# Patient Record
Sex: FEMALE | Race: OTHER | NOT HISPANIC OR LATINO | URBAN - METROPOLITAN AREA
[De-identification: names, ages, dates, MRNs, and addresses within clinical notes are randomized per-mention and may not be internally consistent; named-entity substitution may affect disease eponyms.]

---

## 2021-08-30 ENCOUNTER — EMERGENCY (EMERGENCY)
Facility: HOSPITAL | Age: 56
LOS: 0 days | Discharge: HOME | End: 2021-08-30
Attending: STUDENT IN AN ORGANIZED HEALTH CARE EDUCATION/TRAINING PROGRAM | Admitting: STUDENT IN AN ORGANIZED HEALTH CARE EDUCATION/TRAINING PROGRAM
Payer: COMMERCIAL

## 2021-08-30 ENCOUNTER — INPATIENT (INPATIENT)
Facility: HOSPITAL | Age: 56
LOS: 1 days | Discharge: HOME | End: 2021-09-01
Attending: INTERNAL MEDICINE | Admitting: INTERNAL MEDICINE
Payer: COMMERCIAL

## 2021-08-30 VITALS
RESPIRATION RATE: 20 BRPM | DIASTOLIC BLOOD PRESSURE: 80 MMHG | HEART RATE: 86 BPM | TEMPERATURE: 98 F | SYSTOLIC BLOOD PRESSURE: 160 MMHG | OXYGEN SATURATION: 97 %

## 2021-08-30 VITALS
DIASTOLIC BLOOD PRESSURE: 78 MMHG | HEART RATE: 83 BPM | OXYGEN SATURATION: 99 % | RESPIRATION RATE: 18 BRPM | TEMPERATURE: 99 F | SYSTOLIC BLOOD PRESSURE: 122 MMHG

## 2021-08-30 VITALS
HEART RATE: 98 BPM | TEMPERATURE: 98 F | RESPIRATION RATE: 8 BRPM | SYSTOLIC BLOOD PRESSURE: 162 MMHG | OXYGEN SATURATION: 99 % | DIASTOLIC BLOOD PRESSURE: 75 MMHG

## 2021-08-30 DIAGNOSIS — R10.9 UNSPECIFIED ABDOMINAL PAIN: ICD-10-CM

## 2021-08-30 DIAGNOSIS — R11.10 VOMITING, UNSPECIFIED: ICD-10-CM

## 2021-08-30 DIAGNOSIS — N20.0 CALCULUS OF KIDNEY: ICD-10-CM

## 2021-08-30 LAB
ALBUMIN SERPL ELPH-MCNC: 4.2 G/DL — SIGNIFICANT CHANGE UP (ref 3.5–5.2)
ALBUMIN SERPL ELPH-MCNC: 4.4 G/DL — SIGNIFICANT CHANGE UP (ref 3.5–5.2)
ALP SERPL-CCNC: 106 U/L — SIGNIFICANT CHANGE UP (ref 30–115)
ALP SERPL-CCNC: 110 U/L — SIGNIFICANT CHANGE UP (ref 30–115)
ALT FLD-CCNC: 16 U/L — SIGNIFICANT CHANGE UP (ref 0–41)
ALT FLD-CCNC: 17 U/L — SIGNIFICANT CHANGE UP (ref 0–41)
ANION GAP SERPL CALC-SCNC: 11 MMOL/L — SIGNIFICANT CHANGE UP (ref 7–14)
ANION GAP SERPL CALC-SCNC: 21 MMOL/L — HIGH (ref 7–14)
APPEARANCE UR: CLEAR — SIGNIFICANT CHANGE UP
AST SERPL-CCNC: 21 U/L — SIGNIFICANT CHANGE UP (ref 0–41)
AST SERPL-CCNC: 22 U/L — SIGNIFICANT CHANGE UP (ref 0–41)
BACTERIA # UR AUTO: NEGATIVE — SIGNIFICANT CHANGE UP
BASOPHILS # BLD AUTO: 0.02 K/UL — SIGNIFICANT CHANGE UP (ref 0–0.2)
BASOPHILS # BLD AUTO: 0.03 K/UL — SIGNIFICANT CHANGE UP (ref 0–0.2)
BASOPHILS NFR BLD AUTO: 0.2 % — SIGNIFICANT CHANGE UP (ref 0–1)
BASOPHILS NFR BLD AUTO: 0.2 % — SIGNIFICANT CHANGE UP (ref 0–1)
BILIRUB SERPL-MCNC: 0.4 MG/DL — SIGNIFICANT CHANGE UP (ref 0.2–1.2)
BILIRUB SERPL-MCNC: 0.4 MG/DL — SIGNIFICANT CHANGE UP (ref 0.2–1.2)
BILIRUB UR-MCNC: NEGATIVE — SIGNIFICANT CHANGE UP
BUN SERPL-MCNC: 14 MG/DL — SIGNIFICANT CHANGE UP (ref 10–20)
BUN SERPL-MCNC: 15 MG/DL — SIGNIFICANT CHANGE UP (ref 10–20)
CALCIUM SERPL-MCNC: 10.2 MG/DL — HIGH (ref 8.5–10.1)
CALCIUM SERPL-MCNC: 9.7 MG/DL — SIGNIFICANT CHANGE UP (ref 8.5–10.1)
CHLORIDE SERPL-SCNC: 102 MMOL/L — SIGNIFICANT CHANGE UP (ref 98–110)
CHLORIDE SERPL-SCNC: 98 MMOL/L — SIGNIFICANT CHANGE UP (ref 98–110)
CO2 SERPL-SCNC: 20 MMOL/L — SIGNIFICANT CHANGE UP (ref 17–32)
CO2 SERPL-SCNC: 25 MMOL/L — SIGNIFICANT CHANGE UP (ref 17–32)
COLOR SPEC: SIGNIFICANT CHANGE UP
CREAT SERPL-MCNC: 1 MG/DL — SIGNIFICANT CHANGE UP (ref 0.7–1.5)
CREAT SERPL-MCNC: 1.2 MG/DL — SIGNIFICANT CHANGE UP (ref 0.7–1.5)
DIFF PNL FLD: ABNORMAL
EOSINOPHIL # BLD AUTO: 0.03 K/UL — SIGNIFICANT CHANGE UP (ref 0–0.7)
EOSINOPHIL # BLD AUTO: 0.23 K/UL — SIGNIFICANT CHANGE UP (ref 0–0.7)
EOSINOPHIL NFR BLD AUTO: 0.3 % — SIGNIFICANT CHANGE UP (ref 0–8)
EOSINOPHIL NFR BLD AUTO: 1.9 % — SIGNIFICANT CHANGE UP (ref 0–8)
EPI CELLS # UR: 1 /HPF — SIGNIFICANT CHANGE UP (ref 0–5)
GLUCOSE SERPL-MCNC: 128 MG/DL — HIGH (ref 70–99)
GLUCOSE SERPL-MCNC: 149 MG/DL — HIGH (ref 70–99)
GLUCOSE UR QL: NEGATIVE — SIGNIFICANT CHANGE UP
HCG SERPL QL: NEGATIVE — SIGNIFICANT CHANGE UP
HCT VFR BLD CALC: 39.3 % — SIGNIFICANT CHANGE UP (ref 37–47)
HCT VFR BLD CALC: 40.5 % — SIGNIFICANT CHANGE UP (ref 37–47)
HGB BLD-MCNC: 13.2 G/DL — SIGNIFICANT CHANGE UP (ref 12–16)
HGB BLD-MCNC: 13.6 G/DL — SIGNIFICANT CHANGE UP (ref 12–16)
HYALINE CASTS # UR AUTO: 1 /LPF — SIGNIFICANT CHANGE UP (ref 0–7)
IMM GRANULOCYTES NFR BLD AUTO: 0.3 % — SIGNIFICANT CHANGE UP (ref 0.1–0.3)
IMM GRANULOCYTES NFR BLD AUTO: 0.4 % — HIGH (ref 0.1–0.3)
KETONES UR-MCNC: SIGNIFICANT CHANGE UP
LEUKOCYTE ESTERASE UR-ACNC: ABNORMAL
LIDOCAIN IGE QN: 47 U/L — SIGNIFICANT CHANGE UP (ref 7–60)
LIDOCAIN IGE QN: 56 U/L — SIGNIFICANT CHANGE UP (ref 7–60)
LYMPHOCYTES # BLD AUTO: 1.25 K/UL — SIGNIFICANT CHANGE UP (ref 1.2–3.4)
LYMPHOCYTES # BLD AUTO: 13.6 % — LOW (ref 20.5–51.1)
LYMPHOCYTES # BLD AUTO: 4.98 K/UL — HIGH (ref 1.2–3.4)
LYMPHOCYTES # BLD AUTO: 40.7 % — SIGNIFICANT CHANGE UP (ref 20.5–51.1)
MCHC RBC-ENTMCNC: 27.8 PG — SIGNIFICANT CHANGE UP (ref 27–31)
MCHC RBC-ENTMCNC: 27.9 PG — SIGNIFICANT CHANGE UP (ref 27–31)
MCHC RBC-ENTMCNC: 33.6 G/DL — SIGNIFICANT CHANGE UP (ref 32–37)
MCHC RBC-ENTMCNC: 33.6 G/DL — SIGNIFICANT CHANGE UP (ref 32–37)
MCV RBC AUTO: 82.7 FL — SIGNIFICANT CHANGE UP (ref 81–99)
MCV RBC AUTO: 83.1 FL — SIGNIFICANT CHANGE UP (ref 81–99)
MONOCYTES # BLD AUTO: 0.6 K/UL — SIGNIFICANT CHANGE UP (ref 0.1–0.6)
MONOCYTES # BLD AUTO: 0.91 K/UL — HIGH (ref 0.1–0.6)
MONOCYTES NFR BLD AUTO: 6.5 % — SIGNIFICANT CHANGE UP (ref 1.7–9.3)
MONOCYTES NFR BLD AUTO: 7.4 % — SIGNIFICANT CHANGE UP (ref 1.7–9.3)
NEUTROPHILS # BLD AUTO: 6.03 K/UL — SIGNIFICANT CHANGE UP (ref 1.4–6.5)
NEUTROPHILS # BLD AUTO: 7.28 K/UL — HIGH (ref 1.4–6.5)
NEUTROPHILS NFR BLD AUTO: 49.4 % — SIGNIFICANT CHANGE UP (ref 42.2–75.2)
NEUTROPHILS NFR BLD AUTO: 79.1 % — HIGH (ref 42.2–75.2)
NITRITE UR-MCNC: NEGATIVE — SIGNIFICANT CHANGE UP
NRBC # BLD: 0 /100 WBCS — SIGNIFICANT CHANGE UP (ref 0–0)
NRBC # BLD: 0 /100 WBCS — SIGNIFICANT CHANGE UP (ref 0–0)
PH UR: 7.5 — SIGNIFICANT CHANGE UP (ref 5–8)
PLATELET # BLD AUTO: 191 K/UL — SIGNIFICANT CHANGE UP (ref 130–400)
PLATELET # BLD AUTO: 231 K/UL — SIGNIFICANT CHANGE UP (ref 130–400)
POTASSIUM SERPL-MCNC: 3.4 MMOL/L — LOW (ref 3.5–5)
POTASSIUM SERPL-MCNC: 3.7 MMOL/L — SIGNIFICANT CHANGE UP (ref 3.5–5)
POTASSIUM SERPL-SCNC: 3.4 MMOL/L — LOW (ref 3.5–5)
POTASSIUM SERPL-SCNC: 3.7 MMOL/L — SIGNIFICANT CHANGE UP (ref 3.5–5)
PROT SERPL-MCNC: 7.1 G/DL — SIGNIFICANT CHANGE UP (ref 6–8)
PROT SERPL-MCNC: 7.3 G/DL — SIGNIFICANT CHANGE UP (ref 6–8)
PROT UR-MCNC: NEGATIVE — SIGNIFICANT CHANGE UP
RBC # BLD: 4.73 M/UL — SIGNIFICANT CHANGE UP (ref 4.2–5.4)
RBC # BLD: 4.9 M/UL — SIGNIFICANT CHANGE UP (ref 4.2–5.4)
RBC # FLD: 13.1 % — SIGNIFICANT CHANGE UP (ref 11.5–14.5)
RBC # FLD: 13.2 % — SIGNIFICANT CHANGE UP (ref 11.5–14.5)
RBC CASTS # UR COMP ASSIST: 22 /HPF — HIGH (ref 0–4)
SODIUM SERPL-SCNC: 138 MMOL/L — SIGNIFICANT CHANGE UP (ref 135–146)
SODIUM SERPL-SCNC: 139 MMOL/L — SIGNIFICANT CHANGE UP (ref 135–146)
SP GR SPEC: 1.01 — SIGNIFICANT CHANGE UP (ref 1.01–1.03)
TROPONIN T SERPL-MCNC: <0.01 NG/ML — SIGNIFICANT CHANGE UP
UROBILINOGEN FLD QL: SIGNIFICANT CHANGE UP
WBC # BLD: 12.23 K/UL — HIGH (ref 4.8–10.8)
WBC # BLD: 9.21 K/UL — SIGNIFICANT CHANGE UP (ref 4.8–10.8)
WBC # FLD AUTO: 12.23 K/UL — HIGH (ref 4.8–10.8)
WBC # FLD AUTO: 9.21 K/UL — SIGNIFICANT CHANGE UP (ref 4.8–10.8)
WBC UR QL: 3 /HPF — SIGNIFICANT CHANGE UP (ref 0–5)

## 2021-08-30 PROCEDURE — 71275 CT ANGIOGRAPHY CHEST: CPT | Mod: 26,MA

## 2021-08-30 PROCEDURE — 74174 CTA ABD&PLVS W/CONTRAST: CPT | Mod: 26,MA

## 2021-08-30 PROCEDURE — 71045 X-RAY EXAM CHEST 1 VIEW: CPT | Mod: 26

## 2021-08-30 PROCEDURE — 93010 ELECTROCARDIOGRAM REPORT: CPT

## 2021-08-30 PROCEDURE — 99285 EMERGENCY DEPT VISIT HI MDM: CPT

## 2021-08-30 RX ORDER — KETOROLAC TROMETHAMINE 30 MG/ML
15 SYRINGE (ML) INJECTION ONCE
Refills: 0 | Status: DISCONTINUED | OUTPATIENT
Start: 2021-08-30 | End: 2021-08-30

## 2021-08-30 RX ORDER — HYDROMORPHONE HYDROCHLORIDE 2 MG/ML
1 INJECTION INTRAMUSCULAR; INTRAVENOUS; SUBCUTANEOUS ONCE
Refills: 0 | Status: DISCONTINUED | OUTPATIENT
Start: 2021-08-30 | End: 2021-08-30

## 2021-08-30 RX ORDER — CEFPODOXIME PROXETIL 100 MG
1 TABLET ORAL
Qty: 14 | Refills: 0
Start: 2021-08-30 | End: 2021-09-05

## 2021-08-30 RX ORDER — KETOROLAC TROMETHAMINE 30 MG/ML
30 SYRINGE (ML) INJECTION ONCE
Refills: 0 | Status: DISCONTINUED | OUTPATIENT
Start: 2021-08-30 | End: 2021-08-30

## 2021-08-30 RX ORDER — ONDANSETRON 8 MG/1
4 TABLET, FILM COATED ORAL ONCE
Refills: 0 | Status: COMPLETED | OUTPATIENT
Start: 2021-08-30 | End: 2021-08-30

## 2021-08-30 RX ORDER — MORPHINE SULFATE 50 MG/1
4 CAPSULE, EXTENDED RELEASE ORAL ONCE
Refills: 0 | Status: DISCONTINUED | OUTPATIENT
Start: 2021-08-30 | End: 2021-08-30

## 2021-08-30 RX ORDER — SODIUM CHLORIDE 9 MG/ML
1000 INJECTION, SOLUTION INTRAVENOUS ONCE
Refills: 0 | Status: COMPLETED | OUTPATIENT
Start: 2021-08-30 | End: 2021-08-30

## 2021-08-30 RX ORDER — TAMSULOSIN HYDROCHLORIDE 0.4 MG/1
0.4 CAPSULE ORAL ONCE
Refills: 0 | Status: COMPLETED | OUTPATIENT
Start: 2021-08-30 | End: 2021-08-30

## 2021-08-30 RX ORDER — TAMSULOSIN HYDROCHLORIDE 0.4 MG/1
1 CAPSULE ORAL
Qty: 7 | Refills: 0
Start: 2021-08-30 | End: 2021-09-05

## 2021-08-30 RX ORDER — KETOROLAC TROMETHAMINE 30 MG/ML
1 SYRINGE (ML) INJECTION
Qty: 10 | Refills: 0
Start: 2021-08-30 | End: 2021-09-03

## 2021-08-30 RX ORDER — OXYCODONE AND ACETAMINOPHEN 5; 325 MG/1; MG/1
1 TABLET ORAL ONCE
Refills: 0 | Status: DISCONTINUED | OUTPATIENT
Start: 2021-08-30 | End: 2021-08-30

## 2021-08-30 RX ADMIN — MORPHINE SULFATE 4 MILLIGRAM(S): 50 CAPSULE, EXTENDED RELEASE ORAL at 20:25

## 2021-08-30 RX ADMIN — Medication 30 MILLIGRAM(S): at 19:53

## 2021-08-30 RX ADMIN — SODIUM CHLORIDE 1000 MILLILITER(S): 9 INJECTION, SOLUTION INTRAVENOUS at 02:48

## 2021-08-30 RX ADMIN — HYDROMORPHONE HYDROCHLORIDE 1 MILLIGRAM(S): 2 INJECTION INTRAMUSCULAR; INTRAVENOUS; SUBCUTANEOUS at 01:25

## 2021-08-30 RX ADMIN — Medication 15 MILLIGRAM(S): at 02:49

## 2021-08-30 RX ADMIN — ONDANSETRON 4 MILLIGRAM(S): 8 TABLET, FILM COATED ORAL at 01:25

## 2021-08-30 RX ADMIN — TAMSULOSIN HYDROCHLORIDE 0.4 MILLIGRAM(S): 0.4 CAPSULE ORAL at 06:18

## 2021-08-30 RX ADMIN — OXYCODONE AND ACETAMINOPHEN 1 TABLET(S): 5; 325 TABLET ORAL at 19:24

## 2021-08-30 RX ADMIN — Medication 30 MILLIGRAM(S): at 19:23

## 2021-08-30 RX ADMIN — MORPHINE SULFATE 4 MILLIGRAM(S): 50 CAPSULE, EXTENDED RELEASE ORAL at 20:40

## 2021-08-30 RX ADMIN — OXYCODONE AND ACETAMINOPHEN 1 TABLET(S): 5; 325 TABLET ORAL at 19:53

## 2021-08-30 NOTE — ED PROVIDER NOTE - OBJECTIVE STATEMENT
55 yo female with no pertinent pmh presents c/o L flank pain. pt was in ED earlier today and found to have a L renal stone. pt states to have taken Toradol at home with minimal relief. pt denies any other symptoms including fevers, chill, headache, recent illness/travel, cough, abdominal pain, chest pain, or SOB.

## 2021-08-30 NOTE — ED PROVIDER NOTE - PROGRESS NOTE DETAILS
SR: patient feeling better. Patient a spoken to in detail about results  All questions addressed.  Results of ED work up discussed and patient given a copy of the results. Patient has proper follow up. Return precautions given.

## 2021-08-30 NOTE — ED ADULT NURSE NOTE - OBJECTIVE STATEMENT
Patient presents to ER in [facility] with daughter sat bedside. Patient is A&OX4 . Patient was recently DC from ED @630AM for kidney stones. Patient came back for worsening symptoms. No fevers. No urinary symptoms.

## 2021-08-30 NOTE — ED PROVIDER NOTE - PATIENT PORTAL LINK FT
You can access the FollowMyHealth Patient Portal offered by [facility] by registering at the following website: [email or web address]. By joining [facility]’s FollowMyHealth portal, you will also be able to view your health information using other applications (apps) compatible with our system.

## 2021-08-30 NOTE — ED PROVIDER NOTE - CLINICAL SUMMARY MEDICAL DECISION MAKING FREE TEXT BOX
56 year old female no pmh presents here with acute onset left flank pain 10/10 radiating to llq associated with vomiting and difficulty urination no hematuria. No fever chills cough cp sob.  VS reviewed. Labs imaging ekg obtained and reviewed. Pain medication given, patient felt better. Patient a spoken to in detail about results  All questions addressed.  Results of ED work up discussed and patient given a copy of the results. Patient has proper follow up. Return precautions given. Patient to follow up with urology, meds and antibiotics sent to [facility]

## 2021-08-30 NOTE — ED ADULT NURSE NOTE - NSIMPLEMENTINTERV_GEN_ALL_ED
Implemented All Universal Safety Interventions:  [place] to call system. Call bell, personal items and telephone within reach. Instruct patient to call for assistance. Room bathroom lighting operational. Non-slip footwear when patient is off stretcher. Physically safe environment: no spills, clutter or unnecessary equipment. Stretcher in lowest position, wheels locked, appropriate side rails in place.

## 2021-08-30 NOTE — ED PROVIDER NOTE - PHYSICAL EXAMINATION
VITALS: Reviewed  CONSTITUTIONAL: well developed, well nourished, distress 2/2 pain, speaking in full sentences, nontoxic appearing  SKIN: warm, dry, no rash  HEAD: normocephalic, atraumatic  EYES: PERRL, EOMI, no conjunctival erythema, sclera clear  ENT: patent airway, moist mucous membranes  NECK: supple, no masses  CV:  regular rate, regular rhythm, 2+ radial pulses bilaterally  RESP: no wheezes, no rales, no rhonchi, normal work of breathing  ABD: soft, LLQ tender w/ L CVA tenderness, nondistended, no rebound, no guarding  MSK: normal ROM, no cyanosis, no edema  NEURO: alert, oriented x3  PSYCH: cooperative, appropriate

## 2021-08-30 NOTE — ED PROVIDER NOTE - ATTENDING CONTRIBUTION TO CARE
57 y/o female denies sig PMH / PSH p/w left flank pain x last night, persistent, denies modifying factors, + nausea, denies fever, diarrhea, anorexia, cough, respiratory sx, change in bowel habits or urinary sx, vaginal d/c or other associated complaints at present. Pt seen earlier, labs WNL, CT identified lft UVJ stone w/o associated pathology, was DC on ketorolac and outpt FU but pain returned.    Old chart reviewed.  I have reviewed and agree with the initial nursing note, except as documented in my note.    VSS, awake, alert, no scleral icterus, oropharynx clear, mmm, no jaundice, skin rash or lesions, chest CTAB, non-labored breathing, no w/r/r, +S1/S2, RRR, no m/r/g, abdomen soft, NT, ND, +BS, no hernias or distention, no pulsatile masses or bruits appreciated, no CVA tenderness, no peripheral edema or deformities, alert, clear speech and steady gait.

## 2021-08-30 NOTE — ED PROVIDER NOTE - ATTENDING CONTRIBUTION TO CARE
56 year old female no pmh presents here with acute onset left flank pain 10/10 radiating to llq associated with vomiting and difficulty urination no hematuria. No fever chills cough cp sob.   On exam  CONSTITUTIONAL: patient uncomfortable in stretcher screaming in pain  HEAD: NCAT  EYES: PERRL; EOMI;   ENT: Normal pharynx; mucous membranes pink/moist, no erythema.  NECK: Supple; no meningeal signs  CARD: RRR; nl S1/S2; no M/R/G. Pulses equal bilaterally.  RESP: Respiratory rate and effort are normal; breath sounds clear and equal bilaterally.  ABD: Soft, ND + llq & guardining + left cva  MSK/EXT: No gross deformities; full range of motion.  SKIN: Warm and dry;   NEURO: AAOx3,  PSYCH: Memory Intact, Normal Affect

## 2021-08-30 NOTE — ED PROVIDER NOTE - PROVIDER TOKENS
PROVIDER:[TOKEN:[[id number]],FOLLOWUP:[1-3 Days]],PROVIDER:[TOKEN:[[id number]],FOLLOWUP:[1-3 Days]]

## 2021-08-30 NOTE — ED ADULT TRIAGE NOTE - CHIEF COMPLAINT QUOTE
Patient d/c home today after c/o left flank pain , returned to ED c/o worsening left flank pain that was sudden in onset

## 2021-08-30 NOTE — ED ADULT NURSE REASSESSMENT NOTE - NS ED NURSE REASSESS COMMENT FT1
Patient is resting on stretcher in NAD . Daughters at bedside. Patient states is feeling a little better

## 2021-08-30 NOTE — ED PROVIDER NOTE - NS ED ROS FT
Review of Systems:  CONSTITUTIONAL - No fever  SKIN - No rash  HEMATOLOGIC - No abnormal bleeding or bruising  RESPIRATORY - No shortness of breath, No cough  CARDIAC -No chest pain, No palpitations  GI - No abdominal pain  MUSCULOSKELETAL - No joint paint, No swelling, No back pain  NEUROLOGIC - No numbness, No focal weakness, No headache, No dizziness  All other systems negative, unless specified in HPI

## 2021-08-30 NOTE — ED PROVIDER NOTE - OBJECTIVE STATEMENT
56Y F with no sig PMH presents with CC of flank pain. Patient reports to having 10/10 left flank pain radiating to L groin associated with vomiting and difficulty urinating. No hematuria. No fevers, chills.

## 2021-08-30 NOTE — ED PROVIDER NOTE - CARE PROVIDER_API CALL
Delfino Henderson)  Urology  [address] [place] [address], [address] [place] [address]  Phone: [phone]  Fax: [phone]  Follow Up Time: 1-3 Days    Ernesto Arita  UROLOGY  [address] [place] [address], [place] [address]  Phone: [phone]  Fax: [phone]  Follow Up Time: 1-3 Days

## 2021-08-30 NOTE — ED PROVIDER NOTE - CARE PROVIDERS DIRECT ADDRESSES
[email or web address]

## 2021-08-31 PROBLEM — Z78.9 OTHER SPECIFIED HEALTH STATUS: Chronic | Status: ACTIVE | Noted: 2021-08-30

## 2021-08-31 LAB
ALBUMIN SERPL ELPH-MCNC: 3.8 G/DL — SIGNIFICANT CHANGE UP (ref 3.5–5.2)
ALP SERPL-CCNC: 86 U/L — SIGNIFICANT CHANGE UP (ref 30–115)
ALT FLD-CCNC: 13 U/L — SIGNIFICANT CHANGE UP (ref 0–41)
ANION GAP SERPL CALC-SCNC: 8 MMOL/L — SIGNIFICANT CHANGE UP (ref 7–14)
AST SERPL-CCNC: 17 U/L — SIGNIFICANT CHANGE UP (ref 0–41)
BASOPHILS # BLD AUTO: 0.02 K/UL — SIGNIFICANT CHANGE UP (ref 0–0.2)
BASOPHILS NFR BLD AUTO: 0.3 % — SIGNIFICANT CHANGE UP (ref 0–1)
BILIRUB SERPL-MCNC: 0.4 MG/DL — SIGNIFICANT CHANGE UP (ref 0.2–1.2)
BLD GP AB SCN SERPL QL: SIGNIFICANT CHANGE UP
BUN SERPL-MCNC: 13 MG/DL — SIGNIFICANT CHANGE UP (ref 10–20)
CALCIUM SERPL-MCNC: 9.1 MG/DL — SIGNIFICANT CHANGE UP (ref 8.5–10.1)
CHLORIDE SERPL-SCNC: 104 MMOL/L — SIGNIFICANT CHANGE UP (ref 98–110)
CO2 SERPL-SCNC: 28 MMOL/L — SIGNIFICANT CHANGE UP (ref 17–32)
CREAT SERPL-MCNC: 0.9 MG/DL — SIGNIFICANT CHANGE UP (ref 0.7–1.5)
CULTURE RESULTS: SIGNIFICANT CHANGE UP
EOSINOPHIL # BLD AUTO: 0.1 K/UL — SIGNIFICANT CHANGE UP (ref 0–0.7)
EOSINOPHIL NFR BLD AUTO: 1.3 % — SIGNIFICANT CHANGE UP (ref 0–8)
GLUCOSE SERPL-MCNC: 119 MG/DL — HIGH (ref 70–99)
HCT VFR BLD CALC: 36.1 % — LOW (ref 37–47)
HGB BLD-MCNC: 11.9 G/DL — LOW (ref 12–16)
IMM GRANULOCYTES NFR BLD AUTO: 0.3 % — SIGNIFICANT CHANGE UP (ref 0.1–0.3)
LYMPHOCYTES # BLD AUTO: 1.99 K/UL — SIGNIFICANT CHANGE UP (ref 1.2–3.4)
LYMPHOCYTES # BLD AUTO: 26.3 % — SIGNIFICANT CHANGE UP (ref 20.5–51.1)
MCHC RBC-ENTMCNC: 27.9 PG — SIGNIFICANT CHANGE UP (ref 27–31)
MCHC RBC-ENTMCNC: 33 G/DL — SIGNIFICANT CHANGE UP (ref 32–37)
MCV RBC AUTO: 84.5 FL — SIGNIFICANT CHANGE UP (ref 81–99)
MONOCYTES # BLD AUTO: 0.73 K/UL — HIGH (ref 0.1–0.6)
MONOCYTES NFR BLD AUTO: 9.6 % — HIGH (ref 1.7–9.3)
NEUTROPHILS # BLD AUTO: 4.72 K/UL — SIGNIFICANT CHANGE UP (ref 1.4–6.5)
NEUTROPHILS NFR BLD AUTO: 62.2 % — SIGNIFICANT CHANGE UP (ref 42.2–75.2)
NRBC # BLD: 0 /100 WBCS — SIGNIFICANT CHANGE UP (ref 0–0)
PLATELET # BLD AUTO: 163 K/UL — SIGNIFICANT CHANGE UP (ref 130–400)
POTASSIUM SERPL-MCNC: 3.7 MMOL/L — SIGNIFICANT CHANGE UP (ref 3.5–5)
POTASSIUM SERPL-SCNC: 3.7 MMOL/L — SIGNIFICANT CHANGE UP (ref 3.5–5)
PROT SERPL-MCNC: 6 G/DL — SIGNIFICANT CHANGE UP (ref 6–8)
RBC # BLD: 4.27 M/UL — SIGNIFICANT CHANGE UP (ref 4.2–5.4)
RBC # FLD: 13.3 % — SIGNIFICANT CHANGE UP (ref 11.5–14.5)
SARS-COV-2 RNA SPEC QL NAA+PROBE: SIGNIFICANT CHANGE UP
SODIUM SERPL-SCNC: 140 MMOL/L — SIGNIFICANT CHANGE UP (ref 135–146)
SPECIMEN SOURCE: SIGNIFICANT CHANGE UP
WBC # BLD: 7.58 K/UL — SIGNIFICANT CHANGE UP (ref 4.8–10.8)
WBC # FLD AUTO: 7.58 K/UL — SIGNIFICANT CHANGE UP (ref 4.8–10.8)

## 2021-08-31 PROCEDURE — 99222 1ST HOSP IP/OBS MODERATE 55: CPT

## 2021-08-31 RX ORDER — CEFPODOXIME PROXETIL 100 MG
100 TABLET ORAL EVERY 12 HOURS
Refills: 0 | Status: DISCONTINUED | OUTPATIENT
Start: 2021-08-31 | End: 2021-08-31

## 2021-08-31 RX ORDER — TAMSULOSIN HYDROCHLORIDE 0.4 MG/1
0.4 CAPSULE ORAL AT BEDTIME
Refills: 0 | Status: DISCONTINUED | OUTPATIENT
Start: 2021-08-31 | End: 2021-09-01

## 2021-08-31 RX ORDER — MORPHINE SULFATE 50 MG/1
2 CAPSULE, EXTENDED RELEASE ORAL EVERY 4 HOURS
Refills: 0 | Status: DISCONTINUED | OUTPATIENT
Start: 2021-08-31 | End: 2021-09-01

## 2021-08-31 RX ORDER — CHLORHEXIDINE GLUCONATE 213 G/1000ML
1 SOLUTION TOPICAL
Refills: 0 | Status: DISCONTINUED | OUTPATIENT
Start: 2021-08-31 | End: 2021-09-01

## 2021-08-31 RX ORDER — SODIUM CHLORIDE 9 MG/ML
1000 INJECTION, SOLUTION INTRAVENOUS
Refills: 0 | Status: DISCONTINUED | OUTPATIENT
Start: 2021-08-31 | End: 2021-09-01

## 2021-08-31 RX ORDER — ENOXAPARIN SODIUM 100 MG/ML
40 INJECTION SUBCUTANEOUS DAILY
Refills: 0 | Status: DISCONTINUED | OUTPATIENT
Start: 2021-08-31 | End: 2021-09-01

## 2021-08-31 RX ORDER — OXYCODONE HYDROCHLORIDE 5 MG/1
5 TABLET ORAL EVERY 4 HOURS
Refills: 0 | Status: DISCONTINUED | OUTPATIENT
Start: 2021-08-31 | End: 2021-09-01

## 2021-08-31 RX ORDER — ACETAMINOPHEN 500 MG
650 TABLET ORAL EVERY 6 HOURS
Refills: 0 | Status: DISCONTINUED | OUTPATIENT
Start: 2021-08-31 | End: 2021-09-01

## 2021-08-31 RX ADMIN — MORPHINE SULFATE 2 MILLIGRAM(S): 50 CAPSULE, EXTENDED RELEASE ORAL at 08:48

## 2021-08-31 RX ADMIN — SODIUM CHLORIDE 100 MILLILITER(S): 9 INJECTION, SOLUTION INTRAVENOUS at 14:29

## 2021-08-31 RX ADMIN — CHLORHEXIDINE GLUCONATE 1 APPLICATION(S): 213 SOLUTION TOPICAL at 05:53

## 2021-08-31 RX ADMIN — TAMSULOSIN HYDROCHLORIDE 0.4 MILLIGRAM(S): 0.4 CAPSULE ORAL at 21:26

## 2021-08-31 RX ADMIN — SODIUM CHLORIDE 100 MILLILITER(S): 9 INJECTION, SOLUTION INTRAVENOUS at 01:30

## 2021-08-31 RX ADMIN — SODIUM CHLORIDE 100 MILLILITER(S): 9 INJECTION, SOLUTION INTRAVENOUS at 05:53

## 2021-08-31 NOTE — PROGRESS NOTE ADULT - SUBJECTIVE AND OBJECTIVE BOX
MARIELA PAULSON 56y Female  MRN#: [id number]   Hospital Day:     HPI:  56 Y F with no significant pmh comes to ED with left flank pain since yesterday morning. Initially came in 21 in AM with left flank pain, found to have 3mm left kidney stone, was sent home from ED with toradol. Returns with significant pain, not relieved with toradol. Denies fevers, chills, nausea, vomiting, diarrhea, constipation, chest pain, shortness of breath, palpitations, hematuria. Never had kidney stone before.    ED Course:  T 98.2F, P 98, /75, R 18, S 99% on RA at rest. Given percocet, morphine.  (31 Aug 2021 00:43)      SUBJECTIVE  Patient states that her pain is resolved with medications. She has no acute complains.     INTERVAL HPI AND OVERNIGHT EVENTS:  Patient was examined and seen at bedside. This morning she is resting comfortably in bed and reports no issues or overnight events.    REVIEW OF SYMPTOMS:  CONSTITUTIONAL: No weakness, fevers or chills; No headaches  EYES: No visual changes, eye pain, or discharge  ENT: No vertigo; No ear pain or change in hearing; No sore throat or difficulty swallowing  NECK: No pain or stiffness  RESPIRATORY: No cough, wheezing, or hemoptysis; No shortness of breath  CARDIOVASCULAR: No chest pain or palpitations  GASTROINTESTINAL: No abdominal or epigastric pain; No nausea, vomiting, or hematemesis; No diarrhea or constipation; No melena or hematochezia  GENITOURINARY: No dysuria, frequency or hematuria  MUSCULOSKELETAL: No joint pain, no muscle pain, no weakness  NEUROLOGICAL: No numbness or weakness  SKIN: No itching or rashes    OBJECTIVE  PAST MEDICAL & SURGICAL HISTORY  No pertinent past medical history      ALLERGIES:  No Known Allergies    MEDICATIONS:  STANDING MEDICATIONS  chlorhexidine 4% Liquid 1 Application(s) Topical <User Schedule>  lactated ringers. 1000 milliLiter(s) IV Continuous <Continuous>  tamsulosin 0.4 milliGRAM(s) Oral at bedtime    PRN MEDICATIONS  acetaminophen   Tablet .. 650 milliGRAM(s) Oral every 6 hours PRN  morphine  - Injectable 2 milliGRAM(s) IV Push every 4 hours PRN  oxyCODONE    IR 5 milliGRAM(s) Oral every 4 hours PRN      VITAL SIGNS: Last 24 Hours  T(C): 36.3 (31 Aug 2021 08:00), Max: 36.8 (30 Aug 2021 18:24)  T(F): 97.4 (31 Aug 2021 08:00), Max: 98.2 (30 Aug 2021 18:24)  HR: 63 (31 Aug 2021 08:00) (63 - 98)  BP: 105/60 (31 Aug 2021 08:00) (105/60 - 162/75)  BP(mean): --  RR: 18 (31 Aug 2021 08:00) (8 - 18)  SpO2: 98% (31 Aug 2021 00:04) (98% - 99%)    LABS:                        11.9   7.58  )-----------( 163      ( 31 Aug 2021 05:09 )             36.1     08    140  |  104  |  13  ----------------------------<  119<H>  3.7   |  28  |  0.9    Ca    9.1      31 Aug 2021 05:09    TPro  6.0  /  Alb  3.8  /  TBili  0.4  /  DBili  x   /  AST  17  /  ALT  13  /  AlkPhos  86  08      Urinalysis Basic - ( 30 Aug 2021 04:10 )    Color: Light Yellow / Appearance: Clear / S.015 / pH: x  Gluc: x / Ketone: Trace  / Bili: Negative / Urobili: <2 mg/dL   Blood: x / Protein: Negative / Nitrite: Negative   Leuk Esterase: Moderate / RBC: 22 /HPF / WBC 3 /HPF   Sq Epi: x / Non Sq Epi: 1 /HPF / Bacteria: Negative        Culture - Urine (collected 30 Aug 2021 04:10)  Source: Clean Catch Clean Catch (Midstream)  Final Report (31 Aug 2021 08:08):    <10,000 CFU/mL Normal Urogenital Gladis      CARDIAC MARKERS ( 30 Aug 2021 01:00 )  x     / <0.01 ng/mL / x     / x     / x          RADIOLOGY:   CT ANGIO ABD PELV (W)AW IC      2021    CT ANGIO CHEST PULM ART WAWIC    IMPRESSION:  1. Mild left hydroureteronephrosis with a 0.3 x 0.3 x 0.2 cm calculus within the bladder at region of left UVJ.  2. No evidence of acute aortic or acute intrathoracic pathology.  3. Left ovarian 2 cm cyst. Outpatient and pelvic ultrasound recommended.    XR CHEST 1 VIEW       2021    Impression:  No radiographic evidence of acute cardiopulmonary disease.        PHYSICAL EXAM:  CONSTITUTIONAL: No acute distress, well-developed, well-groomed, AAOx3  HEAD: Atraumatic, normocephalic  PULMONARY: Clear to auscultation bilaterally; no wheezes, rales, or rhonchi  CARDIOVASCULAR: Regular rate and rhythm; no murmurs, rubs, or gallops  GASTROINTESTINAL: Soft, non-tender, non-distended; bowel sounds present, no CVA tenderness bilaterally   MUSCULOSKELETAL: 2+ peripheral pulses; no clubbing, no cyanosis, no edema  SKIN: No rashes or lesions; warm and dry    ASSESSMENT & PLAN  56 Y F with no significant pmhx comes to ED with left flank pain since yesterday morning. Initially came in 21 in AM with left flank pain, found to have 3mm left kidney stone, was sent home from ED with toradol. Returns with significant pain, not relieved with toradol.     #Left 3mm nephrolithiasis  Patient is afebrile, vitals wnl.   CT angio 21 indicated a 0.3 x 0.3 x 0.2 cm calculus within the bladder at region of left UVJ.  CBC demonstrates normal WBC count.   CMP demonstrates no electrolyte abnormalities.   - tylenol, oxycodone, morphine prn   - IVF  - UA was positive only for leukocyte esterase, no bacteria; will d/c abx  - flomax 0.4mg po qhs  - observation for 24hrs     #Left 2cm ovarian cyst  -f/u outpatient    #DVT PPx- None  #GI PPx- None  #Diet- Regular  #CHG  #Activity- AAT  #Dispo- possible d/c home in next 24 hrs  #Code- FULL

## 2021-08-31 NOTE — H&P ADULT - ASSESSMENT
56 Y F with no significant pmh comes to ED with left flank pain since yesterday morning. Initially came in 8/30/21 in AM with left flank pain, found to have 3mm left kidney stone, was sent home from ED with toradol. Returns with significant pain, not relieved with toradol.     #Left 3mm nephrolithiasis  -tylenol, oxycodone, morphine prn   -IVF  -UA was positive only for leukocyte esterase, no bacteria; will d/c abx  -flomax 0.4mg po qhs    #Left 2cm ovarian cyst  -f/u outpatient    #DVT PPx- None  #GI PPx- None  #Diet- Regular  #CHG  #Activity- AAT  #Dispo- d/c home in next 24 hrs  #Code- FULL

## 2021-08-31 NOTE — ED ADULT NURSE REASSESSMENT NOTE - NS ED NURSE REASSESS COMMENT FT1
Patient is admitted to medicine for renal colic on left side. Patient daughter at bedside. Patient is resting comfortably on stretcher.

## 2021-08-31 NOTE — H&P ADULT - NSHPLABSRESULTS_GEN_ALL_CORE
13.2   9.21  )-----------( 191      ( 30 Aug 2021 20:22 )             39.3         138  |  102  |  15  ----------------------------<  128<H>  3.7   |  25  |  1.2    Ca    10.2<H>      30 Aug 2021 20:22    TPro  7.1  /  Alb  4.4  /  TBili  0.4  /  DBili  x   /  AST  21  /  ALT  16  /  AlkPhos  106      Lipase, Serum: 47 U/L (21 @ 20:22)    Urinalysis Basic - ( 30 Aug 2021 04:10 )    Color: Light Yellow / Appearance: Clear / S.015 / pH: x  Gluc: x / Ketone: Trace  / Bili: Negative / Urobili: <2 mg/dL   Blood: x / Protein: Negative / Nitrite: Negative   Leuk Esterase: Moderate / RBC: 22 /HPF / WBC 3 /HPF   Sq Epi: x / Non Sq Epi: 1 /HPF / Bacteria: Negative        < from: CT Angio Chest PE Protocol w/ IV Cont (21 @ 03:23) >    IMPRESSION:    1. Mild left hydroureteronephrosis with a 0.3 x 0.3 x 0.2 cm calculus within the bladder at region of left UVJ.    2. No evidence of acute aortic or acute intrathoracic pathology.    3. Left ovarian 2 cm cyst. Outpatient and pelvic ultrasound recommended.    < end of copied text >    < from: Xray Chest 1 View AP/PA (21 @ 01:46) >    Impression:    No radiographic evidence of acute cardiopulmonary disease.    < end of copied text >    < from: 12 Lead ECG (21 @ 01:54) >    Diagnosis Line Normal sinus rhythm  Normal ECG    < end of copied text >

## 2021-08-31 NOTE — H&P ADULT - HISTORY OF PRESENT ILLNESS
56 Y F with no significant pmh comes to ED with left flank pain since yesterday morning. Initially came in 8/30/21 in AM with left flank pain, found to have 3mm left kidney stone, was sent home from ED with toradol. Returns with significant pain, not relieved with toradol. Denies fevers, chills, nausea, vomiting, diarrhea, constipation, chest pain, shortness of breath, palpitations, hematuria. Never had kidney stone before.    ED Course:  T 98.2F, P 98, /75, R 18, S 99% on RA at rest. Given percocet, morphine.

## 2021-08-31 NOTE — H&P ADULT - ATTENDING COMMENTS
56 Y F with no significant pmh comes to ED with left flank pain since yesterday morning. Initially came in 8/30/21 in AM with left flank pain, found to have 3mm left kidney stone, was sent home from ED with toradol. Returns with significant pain, not relieved with toradol.     Vital Signs Last 24 Hrs  T(F): 97.9 (31 Aug 2021 15:32), Max: 98.2 (30 Aug 2021 18:24)  HR: 87 (31 Aug 2021 15:32) (63 - 98)  BP: 114/57 (31 Aug 2021 15:32) (105/60 - 162/75)  RR: 18 (31 Aug 2021 15:32) (8 - 18)  SpO2: 98% (31 Aug 2021 00:04) (98% - 99%)    PHYSICAL EXAM:  GENERAL: NAD, well-groomed, well-developed  HEAD:  Atraumatic, Normocephalic  EYES: EOMI, conjunctiva and sclera clear  ENMT: Moist mucous membranes, Good dentition, no thrush  NECK: Supple, No JVD  CHEST/LUNG: Clear to auscultation bilaterally, good air entry, non-labored breathing  HEART: RRR; S1/S2, No murmur  ABDOMEN: Soft, Nontender, Nondistended; Bowel sounds present  VASCULAR: Normal pulses, Normal capillary refill  EXTREMITIES: No calf tenderness, No cyanosis, No edema  LYMPH: Normal; No lymphadenopathy noted  SKIN: Warm, Intact  PSYCH: Normal mood, Normal affect  NERVOUS SYSTEM:  A/O x3, Good concentration; CN 2-12 intact, No focal deficits  BAck: No CVA tenderness    #Left 3mm nephrolithiasis  -tylenol, oxycodone, morphine prn   -IVF  -UA was positive only for leukocyte esterase, no bacteria; urine culture neg, no antibiotics  -flomax 0.4mg po qhs  -If pain still uncontrolled 9/1 will consult urology     #Left 2cm ovarian cyst  -f/u outpatient    #DVT PPx- None  #GI PPx- None  #Diet- Regular  #CHG  #Activity- AAT  #Dispo- d/c home if pain is controlled or consult urology   #Code- FULL

## 2021-08-31 NOTE — H&P ADULT - NSHPPHYSICALEXAM_GEN_ALL_CORE
PHYSICAL EXAM:  GENERAL: NAD, in painful discomfort  HEAD:  Atraumatic, Normocephalic  EYES: EOMI, PERRLA, conjunctiva and sclera clear  ENT: Moist mucous membranes  NECK: Supple, No JVD  CHEST/LUNG: Clear to auscultation bilaterally; No rales, rhonchi, wheezing, or rubs. Unlabored respirations  HEART: Regular rate and rhythm; No murmurs, rubs, or gallops  ABDOMEN: Bowel sounds present; Soft, Nontender, Nondistended. No hepatomegaly; left flank ttp  EXTREMITIES:  2+ Peripheral Pulses, brisk capillary refill. No clubbing, cyanosis, or edema  NERVOUS SYSTEM:  Alert & Oriented X3, speech clear. No deficits   MSK: FROM all 4 extremities, full and equal strength  SKIN: No rashes or lesions

## 2021-09-01 VITALS
TEMPERATURE: 97 F | RESPIRATION RATE: 18 BRPM | DIASTOLIC BLOOD PRESSURE: 71 MMHG | SYSTOLIC BLOOD PRESSURE: 131 MMHG | HEART RATE: 72 BPM

## 2021-09-01 LAB
ALBUMIN SERPL ELPH-MCNC: 3.6 G/DL — SIGNIFICANT CHANGE UP (ref 3.5–5.2)
ALP SERPL-CCNC: 87 U/L — SIGNIFICANT CHANGE UP (ref 30–115)
ALT FLD-CCNC: 11 U/L — SIGNIFICANT CHANGE UP (ref 0–41)
ANION GAP SERPL CALC-SCNC: 4 MMOL/L — LOW (ref 7–14)
AST SERPL-CCNC: 14 U/L — SIGNIFICANT CHANGE UP (ref 0–41)
BASOPHILS # BLD AUTO: 0.03 K/UL — SIGNIFICANT CHANGE UP (ref 0–0.2)
BASOPHILS NFR BLD AUTO: 0.5 % — SIGNIFICANT CHANGE UP (ref 0–1)
BILIRUB SERPL-MCNC: 0.5 MG/DL — SIGNIFICANT CHANGE UP (ref 0.2–1.2)
BUN SERPL-MCNC: 10 MG/DL — SIGNIFICANT CHANGE UP (ref 10–20)
CALCIUM SERPL-MCNC: 9.4 MG/DL — SIGNIFICANT CHANGE UP (ref 8.5–10.1)
CHLORIDE SERPL-SCNC: 105 MMOL/L — SIGNIFICANT CHANGE UP (ref 98–110)
CO2 SERPL-SCNC: 31 MMOL/L — SIGNIFICANT CHANGE UP (ref 17–32)
COVID-19 SPIKE DOMAIN AB INTERP: POSITIVE
COVID-19 SPIKE DOMAIN ANTIBODY RESULT: >250 U/ML — HIGH
CREAT SERPL-MCNC: 0.8 MG/DL — SIGNIFICANT CHANGE UP (ref 0.7–1.5)
EOSINOPHIL # BLD AUTO: 0.22 K/UL — SIGNIFICANT CHANGE UP (ref 0–0.7)
EOSINOPHIL NFR BLD AUTO: 3.9 % — SIGNIFICANT CHANGE UP (ref 0–8)
GLUCOSE SERPL-MCNC: 102 MG/DL — HIGH (ref 70–99)
HCT VFR BLD CALC: 38 % — SIGNIFICANT CHANGE UP (ref 37–47)
HGB BLD-MCNC: 12.4 G/DL — SIGNIFICANT CHANGE UP (ref 12–16)
IMM GRANULOCYTES NFR BLD AUTO: 0 % — LOW (ref 0.1–0.3)
LYMPHOCYTES # BLD AUTO: 1.63 K/UL — SIGNIFICANT CHANGE UP (ref 1.2–3.4)
LYMPHOCYTES # BLD AUTO: 29.1 % — SIGNIFICANT CHANGE UP (ref 20.5–51.1)
MAGNESIUM SERPL-MCNC: 1.9 MG/DL — SIGNIFICANT CHANGE UP (ref 1.8–2.4)
MCHC RBC-ENTMCNC: 28.1 PG — SIGNIFICANT CHANGE UP (ref 27–31)
MCHC RBC-ENTMCNC: 32.6 G/DL — SIGNIFICANT CHANGE UP (ref 32–37)
MCV RBC AUTO: 86.2 FL — SIGNIFICANT CHANGE UP (ref 81–99)
MONOCYTES # BLD AUTO: 0.54 K/UL — SIGNIFICANT CHANGE UP (ref 0.1–0.6)
MONOCYTES NFR BLD AUTO: 9.6 % — HIGH (ref 1.7–9.3)
NEUTROPHILS # BLD AUTO: 3.18 K/UL — SIGNIFICANT CHANGE UP (ref 1.4–6.5)
NEUTROPHILS NFR BLD AUTO: 56.9 % — SIGNIFICANT CHANGE UP (ref 42.2–75.2)
NRBC # BLD: 0 /100 WBCS — SIGNIFICANT CHANGE UP (ref 0–0)
PLATELET # BLD AUTO: 153 K/UL — SIGNIFICANT CHANGE UP (ref 130–400)
POTASSIUM SERPL-MCNC: 4.7 MMOL/L — SIGNIFICANT CHANGE UP (ref 3.5–5)
POTASSIUM SERPL-SCNC: 4.7 MMOL/L — SIGNIFICANT CHANGE UP (ref 3.5–5)
PROT SERPL-MCNC: 5.9 G/DL — LOW (ref 6–8)
RBC # BLD: 4.41 M/UL — SIGNIFICANT CHANGE UP (ref 4.2–5.4)
RBC # FLD: 13.2 % — SIGNIFICANT CHANGE UP (ref 11.5–14.5)
SARS-COV-2 IGG+IGM SERPL QL IA: >250 U/ML — HIGH
SARS-COV-2 IGG+IGM SERPL QL IA: POSITIVE
SODIUM SERPL-SCNC: 140 MMOL/L — SIGNIFICANT CHANGE UP (ref 135–146)
WBC # BLD: 5.6 K/UL — SIGNIFICANT CHANGE UP (ref 4.8–10.8)
WBC # FLD AUTO: 5.6 K/UL — SIGNIFICANT CHANGE UP (ref 4.8–10.8)

## 2021-09-01 PROCEDURE — 99232 SBSQ HOSP IP/OBS MODERATE 35: CPT

## 2021-09-01 RX ORDER — TAMSULOSIN HYDROCHLORIDE 0.4 MG/1
1 CAPSULE ORAL
Qty: 30 | Refills: 0
Start: 2021-09-01 | End: 2021-09-30

## 2021-09-01 RX ADMIN — CHLORHEXIDINE GLUCONATE 1 APPLICATION(S): 213 SOLUTION TOPICAL at 05:28

## 2021-09-01 RX ADMIN — ENOXAPARIN SODIUM 40 MILLIGRAM(S): 100 INJECTION SUBCUTANEOUS at 11:07

## 2021-09-01 RX ADMIN — SODIUM CHLORIDE 100 MILLILITER(S): 9 INJECTION, SOLUTION INTRAVENOUS at 09:29

## 2021-09-01 NOTE — DISCHARGE NOTE PROVIDER - CARE PROVIDERS DIRECT ADDRESSES
Final Anesthesia Post-op Assessment    Patient: Shobha Ballesteros  Procedure(s) Performed: RIGHT INDEX FINGER MASS EXCISION  Anesthesia type: Monitor Anesthesia Care    Vital Last Value   Temperature 37.2 °C (98.9 °F) (12/08/17 1100)   Pulse 74 (12/08/17 1100)   Respiratory Rate 18 (12/08/17 1100)   Non-Invasive   Blood Pressure 114/68 (12/08/17 1100)   Arterial  Blood Pressure     Pulse Oximetry 92 % (12/08/17 1100)     Last 24 I/O:   Intake/Output Summary (Last 24 hours) at 12/08/17 1111  Last data filed at 12/08/17 1000   Gross per 24 hour   Intake              800 ml   Output                0 ml   Net              800 ml       PATIENT LOCATION: PACU Phase 2  POST-OP VITAL SIGNS: stable  LEVEL OF CONSCIOUSNESS: participates in exam, answers questions appropriately, awake, alert and oriented  RESPIRATORY STATUS: spontaneous ventilation  CARDIOVASCULAR: blood pressure returned to baseline and stable  HYDRATION: euvolemic    PAIN MANAGEMENT: adequately controlled  NAUSEA: None  AIRWAY PATENCY: patent  POST-OP ASSESSMENT: no complications, patient tolerated procedure well with no complications, no evidence of recall and sufficiently recovered from acute administration of anesthesia effects and able to participate in evaluation  COMPLICATIONS: none      
[email or web address]

## 2021-09-01 NOTE — DISCHARGE NOTE PROVIDER - NSDCCPCAREPLAN_GEN_ALL_CORE_FT
PRINCIPAL DISCHARGE DIAGNOSIS  Diagnosis: Renal colic on left side  Assessment and Plan of Treatment: WHAT YOU NEED TO KNOW:  Kidney stones form in the urinary system when the water and waste in your urine are out of balance. When this happens, certain types of waste crystals separate from the urine. The crystals build up and form kidney stones. You may have more than one kidney stone.  DISCHARGE INSTRUCTIONS:  Seek care immediately if:  You are vomiting and it is not relieved with medicine.  Call your doctor or kidney specialist if:  You have a fever.  You have trouble urinating.  You see blood in your urine.  You have severe pain.  You have any questions or concerns about your condition or care.  Medicines:  Prescription pain medicine may be given. Ask your healthcare provider how to take this medicine safely. Some prescription pain medicines contain acetaminophen. Do not take other medicines that contain acetaminophen without talking to your healthcare provider. Too much acetaminophen may cause liver damage. Prescription pain medicine may cause constipation. Ask your healthcare provider how to prevent or treat constipation.  Medicines to balance your electrolytes may be needed.  Take your medicine as directed. Contact your healthcare provider if you think your medicine is not helping or if you have side effects. Tell him or her if you are allergic to any medicine. Keep a list of the medicines, vitamins, and herbs you take. Include the amounts, and when and why you take them. Bring the list or the pill bottles to follow-up visits. Carry your medicine list with you in case of an emergency.  What you can do to manage kidney stones:  Drink more liquids. Your healthcare provider may tell you to drink at least 8 to 12 (eight-ounce) cups of liquids each day. This helps flush out the kidney stones when you urinate. Water is the best liquid to drink.  Strain your urine every time you go to the bathroom. Urinate through a strainer or a piece of thin cloth to catch the stones. Take the stones to your healthcare provider so they can be sent to the lab for tests. This will help your healthc       PRINCIPAL DISCHARGE DIAGNOSIS  Diagnosis: Renal colic on left side  Assessment and Plan of Treatment: WHAT YOU NEED TO KNOW:  Kidney stones form in the urinary system when the water and waste in your urine are out of balance. When this happens, certain types of waste crystals separate from the urine. The crystals build up and form kidney stones. You may have more than one kidney stone.  DISCHARGE INSTRUCTIONS:  Seek care immediately if:  You are vomiting and it is not relieved with medicine.  Call your doctor or kidney specialist if:  You have a fever.  You have trouble urinating.  You see blood in your urine.  You have severe pain.  You have any questions or concerns about your condition or care.  Medicines:  Prescription pain medicine may be given. Ask your healthcare provider how to take this medicine safely. Some prescription pain medicines contain acetaminophen. Do not take other medicines that contain acetaminophen without talking to your healthcare provider. Too much acetaminophen may cause liver damage. Prescription pain medicine may cause constipation. Ask your healthcare provider how to prevent or treat constipation.  Medicines to balance your electrolytes may be needed.  Take your medicine as directed. Contact your healthcare provider if you think your medicine is not helping or if you have side effects. Tell him or her if you are allergic to any medicine.   What you can do to manage kidney stones:  Drink more liquids. Your healthcare provider may tell you to drink at least 8 to 12 (eight-ounce) cups of liquids each day. This helps flush out the kidney stones when you urinate. Water is the best liquid to drink.  Eat a variety of healthy foods. Healthy foods include fruits, vegetables, whole-grain breads, low-fat dairy products, beans, and fish. You may need to limit how much sodium (salt) or protein you eat. Ask for information about the best foods for you.  Healthy Foods  Be physically active as directed. Your stones may pass more easily if you stay active. Physical activity can also help you manage your weight. Ask about the best activities for you.         PRINCIPAL DISCHARGE DIAGNOSIS  Diagnosis: Renal colic on left side  Assessment and Plan of Treatment: WHAT YOU NEED TO KNOW:  Kidney stones form in the urinary system when the water and waste in your urine are out of balance. When this happens, certain types of waste crystals separate from the urine. The crystals build up and form kidney stones. You may have more than one kidney stone.  DISCHARGE INSTRUCTIONS:  Seek care immediately if:  You are vomiting and it is not relieved with medicine.  Call your doctor or kidney specialist if:  You have a fever.  You have trouble urinating.  You see blood in your urine.  You have severe pain.  You have any questions or concerns about your condition or care.  Medicines:  Prescription pain medicine may be given. Ask your healthcare provider how to take this medicine safely. Some prescription pain medicines contain acetaminophen. Do not take other medicines that contain acetaminophen without talking to your healthcare provider. Too much acetaminophen may cause liver damage. Prescription pain medicine may cause constipation. Ask your healthcare provider how to prevent or treat constipation.  Medicines to balance your electrolytes may be needed.  Take your medicine as directed. Contact your healthcare provider if you think your medicine is not helping or if you have side effects. Tell him or her if you are allergic to any medicine.   What you can do to manage kidney stones:  Drink more liquids. Your healthcare provider may tell you to drink at least 8 to 12 (eight-ounce) cups of liquids each day. This helps flush out the kidney stones when you urinate. Water is the best liquid to drink.  If you find yourself in severe abdominal or side pain, or being able to urinarte very little or not at all,  or begin to have fevers, please return to the ED.       PRINCIPAL DISCHARGE DIAGNOSIS  Diagnosis: Renal colic on left side  Assessment and Plan of Treatment: WHAT YOU NEED TO KNOW:  Kidney stones form in the urinary system when the water and waste in your urine are out of balance. When this happens, certain types of waste crystals separate from the urine. The crystals build up and form kidney stones. You may have more than one kidney stone.  DISCHARGE INSTRUCTIONS:  Seek care immediately if:  You are vomiting and it is not relieved with medicine.  Call your doctor or kidney specialist if:  You have a fever.  You have trouble urinating.  You see blood in your urine.  You have severe pain.  You have any questions or concerns about your condition or care.  Medicines:  Prescription pain medicine may be given. Ask your healthcare provider how to take this medicine safely. Some prescription pain medicines contain acetaminophen. Do not take other medicines that contain acetaminophen without talking to your healthcare provider. Too much acetaminophen may cause liver damage. Prescription pain medicine may cause constipation. Ask your healthcare provider how to prevent or treat constipation.  Medicines to balance your electrolytes may be needed.  Take your medicine as directed. Contact your healthcare provider if you think your medicine is not helping or if you have side effects. Tell him or her if you are allergic to any medicine.   What you can do to manage kidney stones:  Drink more liquids. Your healthcare provider may tell you to drink at least 8 to 12 (eight-ounce) cups of liquids each day. This helps flush out the kidney stones when you urinate. Water is the best liquid to drink.  If you find yourself in severe abdominal or side pain, or being able to urinarte very little or not at all,  or begin to have fevers, please return to the ED.      SECONDARY DISCHARGE DIAGNOSES  Diagnosis: Ovarian cyst  Assessment and Plan of Treatment: Please follow up with primary doctor for further work up

## 2021-09-01 NOTE — DISCHARGE NOTE PROVIDER - CARE PROVIDER_API CALL
Divya Perry)  Urology  [address] [place] [address] [address] [place] [id number]  Phone: [phone]  Fax: [phone]  Follow Up Time: 1 week

## 2021-09-01 NOTE — DISCHARGE NOTE NURSING/CASE MANAGEMENT/SOCIAL WORK - PATIENT PORTAL LINK FT
You can access the FollowMyHealth Patient Portal offered by [facility] by registering at the following website: [email or web address]. By joining [facility]’s FollowMyHealth portal, you will also be able to view your health information using other applications (apps) compatible with our system.

## 2021-09-01 NOTE — PROGRESS NOTE ADULT - ASSESSMENT
56 Y F with no significant pmh comes to ED with left flank pain for one day. Initially came in 8/30/21 in AM with left flank pain, found to have 3mm left kidney stone, was sent home from ED with toradol. Returns with significant pain, not relieved with toradol.     # Left 3mm nephrolithiasis. Left hydronephrosis  - pain is better  - creatinine function is fine  - pt urinates fine  - UA no signs of infection  - started Flomax  - she will need OP follow up urology OP  - oral hydration with tylenol, oxycodone, morphine prn     # Left 2cm ovarian cyst  - f/u outpatient    Pt is clinically stable for discharge home today.     I spent 35 min on discharge process including postdischarge instructions and medications review.

## 2021-09-01 NOTE — DISCHARGE NOTE PROVIDER - HOSPITAL COURSE
56 Y F with no significant pmhx comes to ED with left flank pain since yesterday morning. Initially came in 8/30/21 in AM with left flank pain, found to have 3mm left kidney stone, was sent home from ED with toradol. Returns with significant pain, not relieved with toradol.     Patient's pain has been well-controlled with pain medications as needed since admission. After 24hrs observation and conservative treatment with IV fluids and flomax 0.4mg, patient is clinically stable and experiences no pain. 56 Y F with no significant pmhx comes to ED with left flank pain since yesterday morning. Initially came in 8/30/21 in AM with left flank pain, found to have 3mm left kidney stone, was sent home from ED with toradol. Returns with significant pain, not relieved with toradol.     Patient's pain has been well-controlled with pain medications as needed since admission. After 24hrs observation and conservative treatment with IV fluids and flomax 0.4mg, patient is clinically stable and experiences minimal pain.

## 2021-09-01 NOTE — PROGRESS NOTE ADULT - SUBJECTIVE AND OBJECTIVE BOX
MARIELA PAULSON 56y Female  MRN#: [id number]   Hospital Day: 1d    HPI:  56 Y F with no significant pmh comes to ED with left flank pain since yesterday morning. Initially came in 8/30/21 in AM with left flank pain, found to have 3mm left kidney stone, was sent home from ED with toradol. Returns with significant pain, not relieved with toradol. Denies fevers, chills, nausea, vomiting, diarrhea, constipation, chest pain, shortness of breath, palpitations, hematuria. Never had kidney stone before.    ED Course:  T 98.2F, P 98, /75, R 18, S 99% on RA at rest. Given percocet, morphine.  (31 Aug 2021 00:43)      SUBJECTIVE  Patient has no acute complains. She reports no longer experiencing flank pain or other discomfort.       INTERVAL HPI AND OVERNIGHT EVENTS:  Patient was examined and seen at bedside. This morning she is resting comfortably in bed and reports no issues or overnight events.    REVIEW OF SYMPTOMS:  CONSTITUTIONAL: No weakness, fevers or chills; No headaches  EYES: No visual changes, eye pain, or discharge  ENT: No vertigo; No ear pain or change in hearing; No sore throat or difficulty swallowing  NECK: No pain or stiffness  RESPIRATORY: No cough, wheezing, or hemoptysis; No shortness of breath  CARDIOVASCULAR: No chest pain or palpitations  GASTROINTESTINAL: No abdominal or epigastric pain; No nausea, vomiting, or hematemesis; No diarrhea or constipation; No melena or hematochezia  GENITOURINARY: No dysuria, frequency or hematuria  MUSCULOSKELETAL: No joint pain, no muscle pain, no weakness  NEUROLOGICAL: No numbness or weakness  SKIN: No itching or rashes    OBJECTIVE  PAST MEDICAL & SURGICAL HISTORY  No pertinent past medical history      ALLERGIES:  No Known Allergies    MEDICATIONS:  STANDING MEDICATIONS  chlorhexidine 4% Liquid 1 Application(s) Topical <User Schedule>  enoxaparin Injectable 40 milliGRAM(s) SubCutaneous daily  tamsulosin 0.4 milliGRAM(s) Oral at bedtime    PRN MEDICATIONS  acetaminophen   Tablet .. 650 milliGRAM(s) Oral every 6 hours PRN  morphine  - Injectable 2 milliGRAM(s) IV Push every 4 hours PRN  oxyCODONE    IR 5 milliGRAM(s) Oral every 4 hours PRN      VITAL SIGNS: Last 24 Hours  T(C): 35.8 (01 Sep 2021 07:06), Max: 36.6 (31 Aug 2021 15:32)  T(F): 96.5 (01 Sep 2021 07:06), Max: 97.9 (31 Aug 2021 15:32)  HR: 74 (01 Sep 2021 07:06) (71 - 87)  BP: 124/69 (01 Sep 2021 07:06) (101/58 - 124/69)  BP(mean): --  RR: 18 (01 Sep 2021 07:06) (18 - 18)  SpO2: 98% (01 Sep 2021 07:06) (98% - 98%)    LABS:                        12.4   5.60  )-----------( 153      ( 01 Sep 2021 06:51 )             38.0     09-01    140  |  105  |  10  ----------------------------<  102<H>  4.7   |  31  |  0.8    Ca    9.4      01 Sep 2021 06:51  Mg     1.9     09-01    TPro  5.9<L>  /  Alb  3.6  /  TBili  0.5  /  DBili  x   /  AST  14  /  ALT  11  /  AlkPhos  87  09-01              Culture - Urine (collected 30 Aug 2021 04:10)  Source: Clean Catch Clean Catch (Midstream)  Final Report (31 Aug 2021 08:08):    <10,000 CFU/mL Normal Urogenital Gladis        RADIOLOGY:  CT ANGIO ABD PELV (W)AW IC      08/30/2021    CT ANGIO CHEST PULM ART WAWI    IMPRESSION:  1. Mild left hydroureteronephrosis with a 0.3 x 0.3 x 0.2 cm calculus within the bladder at region of left UVJ.  2. No evidence of acute aortic or acute intrathoracic pathology.  3. Left ovarian 2 cm cyst. Outpatient and pelvic ultrasound recommended.    XR CHEST 1 VIEW       08/30/2021    Impression:  No radiographic evidence of acute cardiopulmonary disease.        PHYSICAL EXAM:  CONSTITUTIONAL: No acute distress, well-developed, well-groomed, AAOx3  HEAD: Atraumatic, normocephalic  PULMONARY: Clear to auscultation bilaterally; no wheezes, rales, or rhonchi  CARDIOVASCULAR: Regular rate and rhythm; no murmurs, rubs, or gallops  ABD: Soft, non-tender, non-distended; bowel sounds present. No CVA tenderness bilaterally   MUSCULOSKELETAL: 2+ peripheral pulses; no clubbing, no cyanosis, no edema  SKIN: No rashes or lesions; warm and dry    ASSESSMENT & PLAN  56 Y F with no significant pmhx comes to ED with left flank pain since yesterday morning. Initially came in 8/30/21 in AM with left flank pain, found to have 3mm left kidney stone, was sent home from ED with toradol. Returns with significant pain, not relieved with toradol.     #Left 3mm nephrolithiasis  Patient is afebrile, vitals wnl.   CT angio 8/30/21 indicated a 0.3 x 0.3 x 0.2 cm calculus within the bladder at region of left UVJ.  CBC demonstrates normal WBC count.   CMP demonstrates no electrolyte abnormalities.   - tylenol, oxycodone, morphine prn   - IVF  - UA was positive only for leukocyte esterase, no bacteria; will d/c abx  - flomax 0.4mg po qhs  - if pain     #Left 2cm ovarian cyst  -f/u outpatient    #DVT PPx- None  #GI PPx- None  #Diet- Regular  #CHG  #Activity- AAT  #Dispo- possible d/c home today  #Code- FULL       MARIELA PAULSON 56y Female  MRN#: [id number]   Hospital Day: 1d    HPI:  56 Y F with no significant pmh comes to ED with left flank pain since yesterday morning. Initially came in 8/30/21 in AM with left flank pain, found to have 3mm left kidney stone, was sent home from ED with toradol. Returns with significant pain, not relieved with toradol. Denies fevers, chills, nausea, vomiting, diarrhea, constipation, chest pain, shortness of breath, palpitations, hematuria. Never had kidney stone before.    ED Course:  T 98.2F, P 98, /75, R 18, S 99% on RA at rest. Given percocet, morphine.  (31 Aug 2021 00:43)      SUBJECTIVE  Patient has no acute complains. She reports no longer experiencing flank pain or other discomfort.       INTERVAL HPI AND OVERNIGHT EVENTS:  Patient was examined and seen at bedside. This morning she is resting comfortably in bed and reports no issues or overnight events.    REVIEW OF SYMPTOMS:  CONSTITUTIONAL: No weakness, fevers or chills; No headaches  EYES: No visual changes, eye pain, or discharge  ENT: No vertigo; No ear pain or change in hearing; No sore throat or difficulty swallowing  NECK: No pain or stiffness  RESPIRATORY: No cough, wheezing, or hemoptysis; No shortness of breath  CARDIOVASCULAR: No chest pain or palpitations  GASTROINTESTINAL: No abdominal or epigastric pain; No nausea, vomiting, or hematemesis; No diarrhea or constipation; No melena or hematochezia  GENITOURINARY: No dysuria, frequency or hematuria  MUSCULOSKELETAL: No joint pain, no muscle pain, no weakness  NEUROLOGICAL: No numbness or weakness  SKIN: No itching or rashes    OBJECTIVE  PAST MEDICAL & SURGICAL HISTORY  No pertinent past medical history      ALLERGIES:  No Known Allergies    MEDICATIONS:  STANDING MEDICATIONS  chlorhexidine 4% Liquid 1 Application(s) Topical <User Schedule>  enoxaparin Injectable 40 milliGRAM(s) SubCutaneous daily  tamsulosin 0.4 milliGRAM(s) Oral at bedtime    PRN MEDICATIONS  acetaminophen   Tablet .. 650 milliGRAM(s) Oral every 6 hours PRN  morphine  - Injectable 2 milliGRAM(s) IV Push every 4 hours PRN  oxyCODONE    IR 5 milliGRAM(s) Oral every 4 hours PRN      VITAL SIGNS: Last 24 Hours  T(C): 35.8 (01 Sep 2021 07:06), Max: 36.6 (31 Aug 2021 15:32)  T(F): 96.5 (01 Sep 2021 07:06), Max: 97.9 (31 Aug 2021 15:32)  HR: 74 (01 Sep 2021 07:06) (71 - 87)  BP: 124/69 (01 Sep 2021 07:06) (101/58 - 124/69)  BP(mean): --  RR: 18 (01 Sep 2021 07:06) (18 - 18)  SpO2: 98% (01 Sep 2021 07:06) (98% - 98%)    LABS:                        12.4   5.60  )-----------( 153      ( 01 Sep 2021 06:51 )             38.0     09-01    140  |  105  |  10  ----------------------------<  102<H>  4.7   |  31  |  0.8    Ca    9.4      01 Sep 2021 06:51  Mg     1.9     09-01    TPro  5.9<L>  /  Alb  3.6  /  TBili  0.5  /  DBili  x   /  AST  14  /  ALT  11  /  AlkPhos  87  09-01              Culture - Urine (collected 30 Aug 2021 04:10)  Source: Clean Catch Clean Catch (Midstream)  Final Report (31 Aug 2021 08:08):    <10,000 CFU/mL Normal Urogenital Gladis        RADIOLOGY:  CT ANGIO ABD PELV (W)AW IC      08/30/2021    CT ANGIO CHEST PULM ART WAWI    IMPRESSION:  1. Mild left hydroureteronephrosis with a 0.3 x 0.3 x 0.2 cm calculus within the bladder at region of left UVJ.  2. No evidence of acute aortic or acute intrathoracic pathology.  3. Left ovarian 2 cm cyst. Outpatient and pelvic ultrasound recommended.    XR CHEST 1 VIEW       08/30/2021    Impression:  No radiographic evidence of acute cardiopulmonary disease.      PHYSICAL EXAM:  CONSTITUTIONAL: No acute distress, well-developed, well-groomed, AAOx3  HEAD: Atraumatic, normocephalic  PULMONARY: Clear to auscultation bilaterally; no wheezes, rales, or rhonchi  CARDIOVASCULAR: Regular rate and rhythm; no murmurs, rubs, or gallops  ABD: Soft, non-tender, non-distended; bowel sounds present. No CVA tenderness bilaterally   MUSCULOSKELETAL: 2+ peripheral pulses; no clubbing, no cyanosis, no edema  SKIN: No rashes or lesions; warm and dry    ASSESSMENT & PLAN  56 Y F with no significant pmhx comes to ED with left flank pain since yesterday morning. Initially came in 8/30/21 in AM with left flank pain, found to have 3mm left kidney stone, was sent home from ED with toradol. Returns with significant pain, not relieved with toradol.     #Left 3mm nephrolithiasis  Patient is afebrile, endorses no pain, vitals wnl.   CT angio 8/30/21 indicated a 0.3 x 0.3 x 0.2 cm calculus within the bladder at region of left UVJ.  CBC demonstrates normal WBC count.   CMP demonstrates no electrolyte abnormalities.   - tylenol, oxycodone, morphine prn   - IVF  - UA was positive only for leukocyte esterase, no bacteria; will d/c abx  - flomax 0.4mg po qhs  - if pain recurs after discharge, f/u with outpatient urologist     #Left 2cm ovarian cyst  -f/u outpatient    #DVT PPx- None  #GI PPx- None  #Diet- Regular  #CHG  #Activity- AAT  #Dispo- possible d/c home today  #Code- FULL       MARIELA PAULSON 56y Female  MRN#: [id number]   Hospital Day: 1d    HPI:  56 Y F with no significant pmh comes to ED with left flank pain since yesterday morning. Initially came in 8/30/21 in AM with left flank pain, found to have 3mm left kidney stone, was sent home from ED with toradol. Returns with significant pain, not relieved with toradol. Denies fevers, chills, nausea, vomiting, diarrhea, constipation, chest pain, shortness of breath, palpitations, hematuria. Never had kidney stone before.    ED Course:  T 98.2F, P 98, /75, R 18, S 99% on RA at rest. Given percocet, morphine.  (31 Aug 2021 00:43)      SUBJECTIVE  Patient has no acute complains. She mentions experiencing some mild, nonradiating pain with an intensity of 1-2/10 in her L lower back area at noon. Spoke to patient's daughter who would like to postpone patient's discharge until patient's pain resolves completely.      INTERVAL HPI AND OVERNIGHT EVENTS:  Patient was examined and seen at bedside. This morning she is resting comfortably in bed and reports no issues or overnight events.    REVIEW OF SYMPTOMS:  CONSTITUTIONAL: No weakness, fevers or chills; No headaches  EYES: No visual changes, eye pain, or discharge  ENT: No vertigo; No ear pain or change in hearing; No sore throat or difficulty swallowing  NECK: No pain or stiffness  RESPIRATORY: No cough, wheezing, or hemoptysis; No shortness of breath  CARDIOVASCULAR: No chest pain or palpitations  GASTROINTESTINAL: No abdominal or epigastric pain; No nausea, vomiting, or hematemesis; No diarrhea or constipation; No melena or hematochezia  GENITOURINARY: No dysuria, frequency or hematuria  MUSCULOSKELETAL: No joint pain, no muscle pain, no weakness  NEUROLOGICAL: No numbness or weakness  SKIN: No itching or rashes    OBJECTIVE  PAST MEDICAL & SURGICAL HISTORY  No pertinent past medical history      ALLERGIES:  No Known Allergies    MEDICATIONS:  STANDING MEDICATIONS  chlorhexidine 4% Liquid 1 Application(s) Topical <User Schedule>  enoxaparin Injectable 40 milliGRAM(s) SubCutaneous daily  tamsulosin 0.4 milliGRAM(s) Oral at bedtime    PRN MEDICATIONS  acetaminophen   Tablet .. 650 milliGRAM(s) Oral every 6 hours PRN  morphine  - Injectable 2 milliGRAM(s) IV Push every 4 hours PRN  oxyCODONE    IR 5 milliGRAM(s) Oral every 4 hours PRN      VITAL SIGNS: Last 24 Hours  T(C): 35.8 (01 Sep 2021 07:06), Max: 36.6 (31 Aug 2021 15:32)  T(F): 96.5 (01 Sep 2021 07:06), Max: 97.9 (31 Aug 2021 15:32)  HR: 74 (01 Sep 2021 07:06) (71 - 87)  BP: 124/69 (01 Sep 2021 07:06) (101/58 - 124/69)  BP(mean): --  RR: 18 (01 Sep 2021 07:06) (18 - 18)  SpO2: 98% (01 Sep 2021 07:06) (98% - 98%)    LABS:                        12.4   5.60  )-----------( 153      ( 01 Sep 2021 06:51 )             38.0     09-01    140  |  105  |  10  ----------------------------<  102<H>  4.7   |  31  |  0.8    Ca    9.4      01 Sep 2021 06:51  Mg     1.9     09-01    TPro  5.9<L>  /  Alb  3.6  /  TBili  0.5  /  DBili  x   /  AST  14  /  ALT  11  /  AlkPhos  87  09-01          Culture - Urine (collected 30 Aug 2021 04:10)  Source: Clean Catch Clean Catch (Midstream)  Final Report (31 Aug 2021 08:08):    <10,000 CFU/mL Normal Urogenital Gladis        RADIOLOGY:  CT ANGIO ABD PELV (W)AW IC      08/30/2021    CT ANGIO CHEST PULM ART WAWIC    IMPRESSION:  1. Mild left hydroureteronephrosis with a 0.3 x 0.3 x 0.2 cm calculus within the bladder at region of left UVJ.  2. No evidence of acute aortic or acute intrathoracic pathology.  3. Left ovarian 2 cm cyst. Outpatient and pelvic ultrasound recommended.    XR CHEST 1 VIEW       08/30/2021    Impression:  No radiographic evidence of acute cardiopulmonary disease.      PHYSICAL EXAM:  CONSTITUTIONAL: No acute distress, well-developed, well-groomed, AAOx3  HEAD: Atraumatic, normocephalic  PULMONARY: Clear to auscultation bilaterally; no wheezes, rales, or rhonchi  CARDIOVASCULAR: Regular rate and rhythm; no murmurs, rubs, or gallops  ABD: Soft, non-tender, non-distended; bowel sounds present. No CVA tenderness bilaterally   MUSCULOSKELETAL: 2+ peripheral pulses; no clubbing, no cyanosis, no edema  SKIN: No rashes or lesions; warm and dry    ASSESSMENT & PLAN  56 Y F with no significant pmhx comes to ED with left flank pain since yesterday morning. Initially came in 8/30/21 in AM with left flank pain, found to have 3mm left kidney stone, was sent home from ED with toradol. Returns with significant pain, not relieved with toradol.     #Left 3mm nephrolithiasis  Patient is afebrile, vitals wnl.   CT angio 8/30/21 indicated a 0.3 x 0.3 x 0.2 cm calculus within the bladder at region of left UVJ.  CBC demonstrates normal WBC count.   CMP demonstrates no electrolyte abnormalities.   - tylenol, oxycodone, morphine prn   - IVF  - UA was positive only for leukocyte esterase, no bacteria; will d/c abx  - flomax 0.4mg po qhs  - observation for 24hrs   - if pain recurs after discharge, f/u with outpatient urologist     #Left 2cm ovarian cyst  -f/u outpatient    #DVT PPx- None  #GI PPx- None  #Diet- Regular  #CHG  #Activity- AAT  #Dispo- possible d/c home tomorrow if pain resolved  #Code- FULL       MARIELA PAULSON 56y Female  MRN#: [id number]   Hospital Day: 1d    HPI:  56 Y F with no significant pmh comes to ED with left flank pain since yesterday morning. Initially came in 8/30/21 in AM with left flank pain, found to have 3mm left kidney stone, was sent home from ED with toradol. Returns with significant pain, not relieved with toradol. Denies fevers, chills, nausea, vomiting, diarrhea, constipation, chest pain, shortness of breath, palpitations, hematuria. Never had kidney stone before.    ED Course:  T 98.2F, P 98, /75, R 18, S 99% on RA at rest. Given percocet, morphine.  (31 Aug 2021 00:43)      SUBJECTIVE  Patient has no acute complains. She mentions experiencing some mild, nonradiating pain with an intensity of 1-2/10 in her L lower back area at noon. Pain resolved with tylenol.    INTERVAL HPI AND OVERNIGHT EVENTS:  Patient was examined and seen at bedside. This morning she is resting comfortably in bed and reports no issues or overnight events.    REVIEW OF SYMPTOMS:  CONSTITUTIONAL: No weakness, fevers or chills; No headaches  EYES: No visual changes, eye pain, or discharge  ENT: No vertigo; No ear pain or change in hearing; No sore throat or difficulty swallowing  NECK: No pain or stiffness  RESPIRATORY: No cough, wheezing, or hemoptysis; No shortness of breath  CARDIOVASCULAR: No chest pain or palpitations  GASTROINTESTINAL: No abdominal or epigastric pain; No nausea, vomiting, or hematemesis; No diarrhea or constipation; No melena or hematochezia  GENITOURINARY: No dysuria, frequency or hematuria  MUSCULOSKELETAL: No joint pain, no muscle pain, no weakness  NEUROLOGICAL: No numbness or weakness  SKIN: No itching or rashes    OBJECTIVE  PAST MEDICAL & SURGICAL HISTORY  No pertinent past medical history      ALLERGIES:  No Known Allergies    MEDICATIONS:  STANDING MEDICATIONS  chlorhexidine 4% Liquid 1 Application(s) Topical <User Schedule>  enoxaparin Injectable 40 milliGRAM(s) SubCutaneous daily  tamsulosin 0.4 milliGRAM(s) Oral at bedtime    PRN MEDICATIONS  acetaminophen   Tablet .. 650 milliGRAM(s) Oral every 6 hours PRN  morphine  - Injectable 2 milliGRAM(s) IV Push every 4 hours PRN  oxyCODONE    IR 5 milliGRAM(s) Oral every 4 hours PRN      VITAL SIGNS: Last 24 Hours  T(C): 35.8 (01 Sep 2021 07:06), Max: 36.6 (31 Aug 2021 15:32)  T(F): 96.5 (01 Sep 2021 07:06), Max: 97.9 (31 Aug 2021 15:32)  HR: 74 (01 Sep 2021 07:06) (71 - 87)  BP: 124/69 (01 Sep 2021 07:06) (101/58 - 124/69)  BP(mean): --  RR: 18 (01 Sep 2021 07:06) (18 - 18)  SpO2: 98% (01 Sep 2021 07:06) (98% - 98%)    LABS:                        12.4   5.60  )-----------( 153      ( 01 Sep 2021 06:51 )             38.0     09-01    140  |  105  |  10  ----------------------------<  102<H>  4.7   |  31  |  0.8    Ca    9.4      01 Sep 2021 06:51  Mg     1.9     09-01    TPro  5.9<L>  /  Alb  3.6  /  TBili  0.5  /  DBili  x   /  AST  14  /  ALT  11  /  AlkPhos  87  09-01          Culture - Urine (collected 30 Aug 2021 04:10)  Source: Clean Catch Clean Catch (Midstream)  Final Report (31 Aug 2021 08:08):    <10,000 CFU/mL Normal Urogenital Gladis        RADIOLOGY:  CT ANGIO ABD PELV (W)AW IC      08/30/2021    CT ANGIO CHEST PULM ART WAWIC    IMPRESSION:  1. Mild left hydroureteronephrosis with a 0.3 x 0.3 x 0.2 cm calculus within the bladder at region of left UVJ.  2. No evidence of acute aortic or acute intrathoracic pathology.  3. Left ovarian 2 cm cyst. Outpatient and pelvic ultrasound recommended.    XR CHEST 1 VIEW       08/30/2021    Impression:  No radiographic evidence of acute cardiopulmonary disease.      PHYSICAL EXAM:  CONSTITUTIONAL: No acute distress, well-developed, well-groomed, AAOx3  HEAD: Atraumatic, normocephalic  PULMONARY: Clear to auscultation bilaterally; no wheezes, rales, or rhonchi  CARDIOVASCULAR: Regular rate and rhythm; no murmurs, rubs, or gallops  ABD: Soft, non-tender, non-distended; bowel sounds present. No CVA tenderness bilaterally   MUSCULOSKELETAL: 2+ peripheral pulses; no clubbing, no cyanosis, no edema  SKIN: No rashes or lesions; warm and dry    ASSESSMENT & PLAN  56 Y F with no significant pmhx comes to ED with left flank pain since yesterday morning. Initially came in 8/30/21 in AM with left flank pain, found to have 3mm left kidney stone, was sent home from ED with toradol. Returns with significant pain, not relieved with toradol.     #Left 3mm nephrolithiasis  Patient is afebrile, vitals wnl.   CT angio 8/30/21 indicated a 0.3 x 0.3 x 0.2 cm calculus within the bladder at region of left UVJ.  CBC demonstrates normal WBC count.   CMP demonstrates no electrolyte abnormalities.   - tylenol, oxycodone, morphine prn   - IVF  - UA was positive only for leukocyte esterase, no bacteria; will d/c abx  - flomax 0.4mg po qhs  - if pain recurs after discharge, f/u with outpatient urologist     #Left 2cm ovarian cyst  -f/u outpatient    #DVT PPx- None  #GI PPx- None  #Diet- Regular  #CHG  #Activity- AAT  #Dispo- possible d/c home today  #Code- FULL

## 2021-09-01 NOTE — DISCHARGE NOTE PROVIDER - NSDCMRMEDTOKEN_GEN_ALL_CORE_FT
cefpodoxime 100 mg oral tablet: 1 tab(s) orally 2 times a day   Flomax 0.4 mg oral capsule: 1 cap(s) orally once a day (at bedtime)   ketorolac 10 mg oral tablet: 1 tab(s) orally 2 times a day    tamsulosin 0.4 mg oral capsule: 1 cap(s) orally once a day (at bedtime)

## 2021-09-01 NOTE — PROGRESS NOTE ADULT - SUBJECTIVE AND OBJECTIVE BOX
MARIELA PAULSON    Patient is a 56y old  Female who presents with a chief complaint of kidney stone (01 Sep 2021 11:03)    INTERVAL HPI/OVERNIGHT EVENTS: No events overnight. Left flank pain is better today.     CONSTITUTIONAL: No weakness, fevers or chills  RESPIRATORY: No cough, wheezing, or shortness of breath  CARDIOVASCULAR: No chest pain or palpitations  GASTROINTESTINAL: No abdominal pain.  GENITOURINARY: No dysuria, frequency or hematuria  NEUROLOGICAL: No numbness or weakness    PHYSICAL EXAM:  T(C): 35.8, Max: 36.6 (08-31-21 @ 15:32)  HR: 74 (71 - 87)  BP: 124/69 (101/58 - 124/69)  RR: 18 (18 - 18)  SpO2: 98% (98% - 98%)    GENERAL: NAD, looks comfortable  PULMONARY/CHEST: No rales, rhonchi, wheezing  CARDIOVASC: Regular rate and rhythm; No murmurs  GI/ABDOMEN: Soft, Nontender, Nondistended; Bowel sounds present  EXTREMITIES: no edema of LE b/l. No calf tenderness b/l.  BACK: no CVA tenderness b/l   NERVOUS SYSTEM:  Alert & Oriented X3, no gross neurological  deficit     LABS:                        12.4   5.60  )-----------( 153      ( 01 Sep 2021 06:51 )             38.0     09-01    140  |  105  |  10  ----------------------------<  102<H>  4.7   |  31  |  0.8    Ca    9.4      01 Sep 2021 06:51  Mg     1.9     09-01    TPro  5.9<L>  /  Alb  3.6  /  TBili  0.5  /  DBili  x   /  AST  14  /  ALT  11  /  AlkPhos  87  09-01      Culture - Urine (collected 30 Aug 2021 04:10)  Source: Clean Catch Clean Catch (Midstream)  Final Report (31 Aug 2021 08:08):    <10,000 CFU/mL Normal Urogenital Gladis      RADIOLOGY & ADDITIONAL TESTS:  < from: CT Angio Chest PE Protocol w/ IV Cont (08.30.21 @ 03:23) >  FINDINGS:    AORTA: No CT evidence of aortic dissection, intramural hematoma, or aneurysm. Origins of the celiac artery, SMA, bilateral renal arteries, VAL[place], and bilateral common iliac arteries are patent.    LUNGS: Respiratory motion limits evaluation of the parenchyma. Bibasilar atelectatic changes. Central airways are patent. No focal consolidation, pleural effusion, or pneumothorax. No bronchiectasis or honeycombing.  Punctate probable fissure-based lymph node at right major fissure. No suspicious pulmonary nodule.    HEART/GREAT VESSELS: The heart is not visually enlarged. The great vessels are of normal caliber. No pericardial effusion.    MEDIASTINUM/THORACIC NODES: No enlarged lymph nodes.    HEPATOBILIARY: Unremarkable.    SPLEEN: Unremarkable.    PANCREAS: Unremarkable.    ADRENAL GLANDS: Unremarkable.    KIDNEYS: Symmetric renal enhancement.  Mild left hydroureteronephrosis with a 0.3 x 0.3 x 0.2 cm calculus within the bladder at region of left UVJ,  640 Max HU.    ABDOMINAL/PELVIIC NODES: No enlarged abdominal or pelvic lymph nodes.    PELVIC ORGANS: Small bladder calculus, as above. Left ovarian 2 cm cyst.    PERITONEUM/MESENTERY/BOWEL: No intraperitoneal free air, ascites, or bowel obstruction. Normal caliber appendix.    BONES/SOFT TISSUES: No aggressive osseous lesion or destructive soft tissue process. Degenerative osseous changes.    IMPRESSION:    1. Mild left hydroureteronephrosis with a 0.3 x 0.3 x 0.2 cm calculus within the bladder at region of left UVJ.    2. No evidence of acute aortic or acute intrathoracic pathology.    3. Left ovarian 2 cm cyst. Outpatient and pelvic ultrasound recommended.    < end of copied text >      MEDICATIONS  (STANDING):  chlorhexidine 4% Liquid 1 Application(s) Topical <User Schedule>  enoxaparin Injectable 40 milliGRAM(s) SubCutaneous daily  tamsulosin 0.4 milliGRAM(s) Oral at bedtime    MEDICATIONS  (PRN):  acetaminophen   Tablet .. 650 milliGRAM(s) Oral every 6 hours PRN Mild Pain (1 - 3)  morphine  - Injectable 2 milliGRAM(s) IV Push every 4 hours PRN Severe Pain (7 - 10)  oxyCODONE    IR 5 milliGRAM(s) Oral every 4 hours PRN Moderate Pain (4 - 6)

## 2021-09-03 DIAGNOSIS — N83.202 UNSPECIFIED OVARIAN CYST, LEFT SIDE: ICD-10-CM

## 2021-09-03 DIAGNOSIS — N13.2 HYDRONEPHROSIS WITH RENAL AND URETERAL CALCULOUS OBSTRUCTION: ICD-10-CM

## 2021-09-03 DIAGNOSIS — N20.0 CALCULUS OF KIDNEY: ICD-10-CM

## 2023-12-07 NOTE — ED ADULT NURSE NOTE - NS ED NURSE LEVEL OF CONSCIOUSNESS MENTAL STATUS
Detail Level: Detailed Quality 226: Preventive Care And Screening: Tobacco Use: Screening And Cessation Intervention: Patient screened for tobacco use and is an ex/non-smoker Quality 431: Preventive Care And Screening: Unhealthy Alcohol Use - Screening: Patient not identified as an unhealthy alcohol user when screened for unhealthy alcohol use using a systematic screening method Awake/Alert/Cooperative